# Patient Record
Sex: MALE | Race: WHITE
[De-identification: names, ages, dates, MRNs, and addresses within clinical notes are randomized per-mention and may not be internally consistent; named-entity substitution may affect disease eponyms.]

---

## 2023-03-06 ENCOUNTER — HOSPITAL ENCOUNTER (OUTPATIENT)
Dept: HOSPITAL 95 - ORSCMMR | Age: 53
Discharge: HOME | End: 2023-03-06
Attending: SURGERY
Payer: OTHER GOVERNMENT

## 2023-03-06 VITALS — HEIGHT: 76 IN | BODY MASS INDEX: 31.46 KG/M2 | WEIGHT: 258.38 LBS

## 2023-03-06 DIAGNOSIS — Z79.899: ICD-10-CM

## 2023-03-06 DIAGNOSIS — K42.0: Primary | ICD-10-CM

## 2023-03-06 DIAGNOSIS — K76.0: ICD-10-CM

## 2023-03-06 DIAGNOSIS — Z87.891: ICD-10-CM

## 2023-03-06 PROCEDURE — 0WUF0JZ SUPPLEMENT ABDOMINAL WALL WITH SYNTHETIC SUBSTITUTE, OPEN APPROACH: ICD-10-PCS | Performed by: SURGERY

## 2023-03-06 PROCEDURE — C1781 MESH (IMPLANTABLE): HCPCS

## 2023-03-06 PROCEDURE — 3E0M05Z INTRODUCTION OF ADHESION BARRIER INTO PERITONEAL CAVITY, OPEN APPROACH: ICD-10-PCS | Performed by: SURGERY

## 2023-03-06 NOTE — NUR
Ambulatory in Day Surgery.
Discharge instructions reviewed with patient. Patient verbalizes understanding.
Copy given to patient to take home.
Dressing to procedure site clean, dry, intact with no visible
drainage, swelling, erythema or bruising noted.
Discharged via wheelchair to private car for ride home.